# Patient Record
Sex: FEMALE | Race: WHITE | NOT HISPANIC OR LATINO | Employment: STUDENT | URBAN - METROPOLITAN AREA
[De-identification: names, ages, dates, MRNs, and addresses within clinical notes are randomized per-mention and may not be internally consistent; named-entity substitution may affect disease eponyms.]

---
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
## 2019-03-10 ENCOUNTER — HOSPITAL ENCOUNTER (EMERGENCY)
Facility: CLINIC | Age: 19
Discharge: HOME | End: 2019-03-10
Attending: EMERGENCY MEDICINE | Admitting: EMERGENCY MEDICINE

## 2019-03-10 VITALS
SYSTOLIC BLOOD PRESSURE: 119 MMHG | OXYGEN SATURATION: 99 % | DIASTOLIC BLOOD PRESSURE: 89 MMHG | HEART RATE: 83 BPM | TEMPERATURE: 97.5 F | RESPIRATION RATE: 16 BRPM

## 2019-03-10 DIAGNOSIS — R11.2 NON-INTRACTABLE VOMITING WITH NAUSEA, UNSPECIFIED VOMITING TYPE: ICD-10-CM

## 2019-03-10 DIAGNOSIS — F10.921 ALCOHOL INTOXICATION, WITH DELIRIUM (H): ICD-10-CM

## 2019-03-10 DIAGNOSIS — R41.82 ALTERED MENTAL STATUS, UNSPECIFIED ALTERED MENTAL STATUS TYPE: ICD-10-CM

## 2019-03-10 DIAGNOSIS — R45.1 AGITATION REQUIRING SEDATION PROTOCOL: ICD-10-CM

## 2019-03-10 LAB
ALCOHOL BREATH TEST: 0.09 (ref 0–0.01)
ALCOHOL BREATH TEST: 0.1 (ref 0–0.01)
ALCOHOL BREATH TEST: 0.11 (ref 0–0.01)
ALCOHOL BREATH TEST: 0.17 (ref 0–0.01)
ETHANOL SERPL-MCNC: 0.27 G/DL

## 2019-03-10 PROCEDURE — 99284 EMERGENCY DEPT VISIT MOD MDM: CPT | Mod: Z6 | Performed by: EMERGENCY MEDICINE

## 2019-03-10 PROCEDURE — 80320 DRUG SCREEN QUANTALCOHOLS: CPT | Performed by: EMERGENCY MEDICINE

## 2019-03-10 PROCEDURE — 25000128 H RX IP 250 OP 636: Performed by: EMERGENCY MEDICINE

## 2019-03-10 PROCEDURE — 96372 THER/PROPH/DIAG INJ SC/IM: CPT | Performed by: EMERGENCY MEDICINE

## 2019-03-10 PROCEDURE — 99284 EMERGENCY DEPT VISIT MOD MDM: CPT | Performed by: EMERGENCY MEDICINE

## 2019-03-10 PROCEDURE — 82075 ASSAY OF BREATH ETHANOL: CPT | Performed by: EMERGENCY MEDICINE

## 2019-03-10 RX ORDER — ONDANSETRON 4 MG/1
4 TABLET, ORALLY DISINTEGRATING ORAL ONCE
Status: DISCONTINUED | OUTPATIENT
Start: 2019-03-10 | End: 2019-03-10

## 2019-03-10 RX ORDER — OLANZAPINE 10 MG/2ML
10 INJECTION, POWDER, FOR SOLUTION INTRAMUSCULAR ONCE
Status: COMPLETED | OUTPATIENT
Start: 2019-03-10 | End: 2019-03-10

## 2019-03-10 RX ADMIN — OLANZAPINE 10 MG: 10 INJECTION, POWDER, FOR SOLUTION INTRAMUSCULAR at 01:23

## 2019-03-10 NOTE — ED AVS SNAPSHOT
[facility], [place], Emergency Department  [address] [address]  Phone:  [phone]                                    Phoenix Gao   MRN: [id number]    Department:  [facility], Emergency Department   Date of Visit:  3/10/2019           After Visit Summary Signature Page    I have received my discharge instructions, and my questions have been answered. I have discussed any challenges I see with this plan with the nurse or doctor.    ..........................................................................................................................................  Patient/Patient Representative Signature      ..........................................................................................................................................  Patient Representative Print Name and Relationship to Patient    ..................................................               ................................................  Date                                   Time    ..........................................................................................................................................  Reviewed by Signature/Title    ...................................................              ..............................................  Date                                               Time          22EPIC Rev 08/18
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
## 2019-03-10 NOTE — ED NOTES
Emergency Department Patient Sign-out       Brief HPI:  This is a 18 year old female signed out to me by Dr. Shepard.  See initial ED Provider note for details of the presentation.          Patient is medically cleared for discharge.  The patient is not on a hold.      The patient has required medication for agitation.    Patient is seen and examined.  She is no longer clinically intoxicated.  She is alert, awake and quite remorseful.  She will be discharged to follow-up with the [facility] as necessary.      Significant Events prior to my assuming care: Patient required sedation with Zyprexa and slept 7 hours.      Exam:   Patient Vitals for the past 24 hrs:   BP Temp Temp src Pulse Resp SpO2   03/10/19 1300 113/85 -- -- 89 -- 100 %   03/10/19 0930 116/79 -- -- -- -- 100 %   03/10/19 0927 -- -- -- -- -- 100 %   03/10/19 0926 -- -- -- -- -- 96 %   03/10/19 0925 116/79 -- -- 95 -- --   03/10/19 0812 103/63 -- -- 94 -- --   03/10/19 0803 -- -- -- -- -- 98 %   03/10/19 0802 -- -- -- -- -- 97 %   03/10/19 0801 -- -- -- -- -- 97 %   03/10/19 0715 -- -- -- -- -- 96 %   03/10/19 0636 106/88 -- -- 83 -- --   03/10/19 0635 -- -- -- -- -- 100 %   03/10/19 0634 -- -- -- 86 -- --   03/10/19 0410 -- -- -- -- -- 97 %   03/10/19 0350 98/51 -- -- -- -- --   03/10/19 0320 110/66 -- -- 81 -- --   03/10/19 0315 110/66 -- -- -- -- --   03/10/19 0115 -- -- -- -- -- 100 %   03/10/19 0105 114/68 97.5  F (36.4  C) Oral 76 16 100 %           ED RESULTS:   Results for orders placed or performed during the hospital encounter of 03/10/19 (from the past 24 hour(s))   Alcohol     Status: Abnormal    Collection Time: 03/10/19  1:16 AM   Result Value Ref Range    Ethanol g/dL 0.27 (H) <0.01 g/dL   Alcohol breath test POCT     Status: Abnormal    Collection Time: 03/10/19  1:27 AM   Result Value Ref Range    Alcohol Breath Test 0.104 (A) 0.00 - 0.01   Alcohol breath test POCT     Status: Abnormal    Collection Time: 03/10/19  8:08  AM   Result Value Ref Range    Alcohol Breath Test 0.167 (A) 0.00 - 0.01   Alcohol breath test POCT     Status: Abnormal    Collection Time: 03/10/19 10:33 AM   Result Value Ref Range    Alcohol Breath Test 0.114 (A) 0.00 - 0.01   Alcohol breath test POCT     Status: Abnormal    Collection Time: 03/10/19  1:16 PM   Result Value Ref Range    Alcohol Breath Test 0.088 (A) 0.00 - 0.01       ED MEDICATIONS:   Medications   OLANZapine (zyPREXA) injection 10 mg (10 mg Intramuscular Given 3/10/19 0123)         Impression:    ICD-10-CM    1. Alcohol intoxication, with delirium (H) F10.921    2. Agitation requiring sedation protocol R45.1    3. Altered mental status, unspecified altered mental status type R41.82    4. Non-intractable vomiting with nausea, unspecified vomiting type R11.2        Plan:    Patient presented acutely intoxicated and agitated.  She does not currently recall the events which led to emergency room visit.  We discussed alcohol use.  Patient is extremely remorseful.  She was given resources regarding chemical dependency assessment.  She is not holdable.  She will be discharged.      Clemente Seay MD  03/10/19 5668

## 2019-03-10 NOTE — DISCHARGE INSTRUCTIONS
Please use the below resources and your primary care physician to safely cease alcohol use.     Return to the ED if you are having any urgent/life-threatening concerns.     DISCHARGE RESOURCES:  -SMART Recovery - self management for addiction recovery:  www.smartrecClean Filtration Technologyy.org    -[facility] ~ A Health Crisis Resource & Support Center: [phone].  -[facility] [phone]   -[facility] [place] [facility] [phone] or [phone].  -Substance Abuse and Mental Health Services (www.samhsa.gov)  -Harm Reduction Coalition (www. Harmreduction.org)  -Poison control 1-650.942.6992       Sober Support Group Information:  AA/NA & Sponsor/Support  -Alcoholics Anonymous (www.alcoholics-anonymous.org): for local information 24 hours/day  -AA Intergroup service office in [place] (http://www.aasIron Will [employer]ground [employer].org/) [phone]  -AA Intergroup service office in [facility]: [phone]. (http://www.a[place].org/)  -Narcotics Anonymous (www.nam[place].org) (760) 544-4014   -Sober Fun Activities: www.sober-activities.[employer]/[facility]//[facility] Recovery Connection ([facility])  [facility] connects people seeking recovery to resources that help foster and sustain long-term recovery.  Whether you are seeking resources for treatment, transportation, housing, job training, education, health care or other pathways to recovery, [facility] is a great place to start.   Phone: [phone]. www.[place].org.[place] (Great listing of all types of recovery and non-recovery related resources)

## 2019-11-03 ENCOUNTER — HEALTH MAINTENANCE LETTER (OUTPATIENT)
Age: 19
End: 2019-11-03

## 2020-11-16 ENCOUNTER — HEALTH MAINTENANCE LETTER (OUTPATIENT)
Age: 20
End: 2020-11-16

## 2021-01-15 ENCOUNTER — HEALTH MAINTENANCE LETTER (OUTPATIENT)
Age: 21
End: 2021-01-15

## 2021-05-28 ENCOUNTER — RECORDS - HEALTHEAST (OUTPATIENT)
Dept: ADMINISTRATIVE | Facility: CLINIC | Age: 21
End: 2021-05-28

## 2021-09-18 ENCOUNTER — HEALTH MAINTENANCE LETTER (OUTPATIENT)
Age: 21
End: 2021-09-18

## 2021-11-22 ENCOUNTER — HOSPITAL ENCOUNTER (EMERGENCY)
Facility: CLINIC | Age: 21
Discharge: HOME | End: 2021-11-22
Attending: EMERGENCY MEDICINE | Admitting: STUDENT IN AN ORGANIZED HEALTH CARE EDUCATION/TRAINING PROGRAM
Payer: COMMERCIAL

## 2021-11-22 VITALS
OXYGEN SATURATION: 98 % | DIASTOLIC BLOOD PRESSURE: 80 MMHG | SYSTOLIC BLOOD PRESSURE: 123 MMHG | TEMPERATURE: 99.8 F | HEART RATE: 77 BPM | RESPIRATION RATE: 20 BRPM

## 2021-11-22 DIAGNOSIS — T50.901A ACCIDENTAL DRUG OVERDOSE, INITIAL ENCOUNTER: ICD-10-CM

## 2021-11-22 LAB
AMPHETAMINES UR QL SCN: NORMAL
BARBITURATES UR QL: NORMAL
BENZODIAZ UR QL: NORMAL
CANNABINOIDS UR QL SCN: NORMAL
COCAINE UR QL: NORMAL
OPIATES UR QL SCN: NORMAL

## 2021-11-22 PROCEDURE — 99283 EMERGENCY DEPT VISIT LOW MDM: CPT | Performed by: STUDENT IN AN ORGANIZED HEALTH CARE EDUCATION/TRAINING PROGRAM

## 2021-11-22 PROCEDURE — 80307 DRUG TEST PRSMV CHEM ANLYZR: CPT | Performed by: STUDENT IN AN ORGANIZED HEALTH CARE EDUCATION/TRAINING PROGRAM

## 2021-11-22 ASSESSMENT — ENCOUNTER SYMPTOMS
EYE DISCHARGE: 0
FEVER: 0
WEAKNESS: 0
CHILLS: 0
NAUSEA: 0
WOUND: 0
HEADACHES: 0
DIARRHEA: 0
BACK PAIN: 0
HEMATURIA: 0
FLANK PAIN: 0
EYE REDNESS: 0
SHORTNESS OF BREATH: 0
ABDOMINAL PAIN: 0
RHINORRHEA: 0
NECK PAIN: 0
DIZZINESS: 0
SORE THROAT: 0
NUMBNESS: 0
DYSURIA: 0
FACIAL SWELLING: 0
VOMITING: 0
CONSTIPATION: 0
WHEEZING: 0
EYE PAIN: 0
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
## 2021-11-22 NOTE — ED NOTES
Patient Phoenix Gao has been discharged to prior residence/self-care. After visit summary reviewed at bedside, patient voices no concerns at this time. Belongings and prescriptions sent with patient. Patient ambulated independently out of Emergency Department.     Condition during discharge: Stable    /80   Pulse 77   Temp 99.8  F (37.7  C) (Oral)   Resp 20   SpO2 98%     Brenna Riley RN

## 2021-12-17 ENCOUNTER — VIRTUAL VISIT (OUTPATIENT)
Dept: FAMILY MEDICINE | Facility: CLINIC | Age: 21
End: 2021-12-17
Payer: COMMERCIAL

## 2021-12-17 DIAGNOSIS — B00.1 RECURRENT COLD SORES: Primary | ICD-10-CM

## 2021-12-17 PROCEDURE — 99203 OFFICE O/P NEW LOW 30 MIN: CPT | Mod: 95 | Performed by: FAMILY MEDICINE

## 2021-12-17 RX ORDER — VALACYCLOVIR HYDROCHLORIDE 1 G/1
2000 TABLET, FILM COATED ORAL 2 TIMES DAILY
Qty: 4 TABLET | Refills: 3 | Status: SHIPPED | OUTPATIENT
Start: 2021-12-17 | End: 2023-05-02

## 2021-12-17 NOTE — PATIENT INSTRUCTIONS
Did you know?      You can schedule a video visit for follow-up appointments as well as future appointments for certain conditions.  Please see the below link.     [email or web address]    If you have not already done so,  I encourage you to sign up for Number [email or web address] ([email or web address]).  This will allow you to review your results, securely communicate with a provider, and schedule virtual visits as well.

## 2021-12-17 NOTE — PROGRESS NOTES
Kevin is a 21 year old who is being evaluated via a billable video visit.      How would you like to obtain your AVS? MyChart  If the video visit is dropped, the invitation should be resent by: Text to cell phone: [phone]  Will anyone else be joining your video visit? No      Video Start Time: 9:31 AM    Assessment & Plan     Recurrent cold sores  No previous treatment.  We discussed about nature of the disease and triggers.  We discussed about antiviral medication and we agreed to start on valacyclovir.  We discussed how to use it.  If needing to use it on a regular basis she should come in for lab appointment for hepatic and renal follow-up.  Follow-up yearly otherwise.  - valACYclovir (VALTREX) 1000 mg tablet; Take 2 tablets (2,000 mg) by mouth 2 times daily for 1 day             Tobacco Cessation:   reports that she has been smoking. She has never used smokeless tobacco.          No follow-ups on file.    Fabiano Hale MD, MD  [facility]   Kevin is a 21 year old who presents for the following health issues     HPI     Concern - cold sores   Onset: always happen this time of year  Description: 1 cold sore is forming now around mouth   Intensity: mild  Progression of Symptoms:  worsening  Accompanying Signs & Symptoms: tingling   Previous history of similar problem: yes  Precipitating factors:        Worsened by: stress -had finals for school recently  Alleviating factors:        Improved by: none  Therapies tried and outcome: abreeva did not help     Cold sores - for years.   Father and brother had it too.   Usually stress makes it appear.   Last episode a year ago. When happens it lasts for 2.5 weeks or so.   Used abreva - it did not help.   Yesterday noticed bump on lip.       Review of Systems         Objective           Vitals:  No vitals were obtained today due to virtual visit.    Physical Exam   GENERAL: Healthy, alert and no distress  EYES: Eyes  grossly normal to inspection.  No discharge or erythema, or obvious scleral/conjunctival abnormalities.  RESP: No audible wheeze, cough, or visible cyanosis.  No visible retractions or increased work of breathing.    SKIN: per patient right upper lip is developing lesion. Due to camera quality hard to tell.   NEURO: Cranial nerves grossly intact.  Mentation and speech appropriate for age.  PSYCH: Mentation appears normal, affect normal/bright, judgement and insight intact, normal speech and appearance well-groomed.                Video-Visit Details    Type of service:  Video Visit    Video End Time:9:39 AM    Originating Location (pt. Location): Home    Distant Location (provider location):  [facility]     Platform used for Video Visit: [email or web address]

## 2022-01-08 ENCOUNTER — HEALTH MAINTENANCE LETTER (OUTPATIENT)
Age: 22
End: 2022-01-08

## 2022-07-26 ENCOUNTER — TRANSFERRED RECORDS (OUTPATIENT)
Dept: HEALTH INFORMATION MANAGEMENT | Facility: CLINIC | Age: 22
End: 2022-07-26

## 2022-11-19 ENCOUNTER — HEALTH MAINTENANCE LETTER (OUTPATIENT)
Age: 22
End: 2022-11-19

## 2023-04-15 ENCOUNTER — HEALTH MAINTENANCE LETTER (OUTPATIENT)
Age: 23
End: 2023-04-15

## 2023-05-02 ENCOUNTER — OFFICE VISIT (OUTPATIENT)
Dept: FAMILY MEDICINE | Facility: CLINIC | Age: 23
End: 2023-05-02
Payer: COMMERCIAL

## 2023-05-02 ENCOUNTER — PATIENT OUTREACH (OUTPATIENT)
Dept: ONCOLOGY | Facility: CLINIC | Age: 23
End: 2023-05-02

## 2023-05-02 VITALS
OXYGEN SATURATION: 98 % | BODY MASS INDEX: 26.66 KG/M2 | DIASTOLIC BLOOD PRESSURE: 63 MMHG | TEMPERATURE: 98 F | RESPIRATION RATE: 12 BRPM | WEIGHT: 180 LBS | HEIGHT: 69 IN | SYSTOLIC BLOOD PRESSURE: 120 MMHG | HEART RATE: 66 BPM

## 2023-05-02 DIAGNOSIS — Z00.00 VISIT FOR PREVENTIVE HEALTH EXAMINATION: Primary | ICD-10-CM

## 2023-05-02 DIAGNOSIS — Z80.3 FAMILY HISTORY OF BREAST CANCER IN MOTHER: ICD-10-CM

## 2023-05-02 PROBLEM — B00.1 COLD SORE: Status: ACTIVE | Noted: 2021-12-16

## 2023-05-02 PROCEDURE — 99395 PREV VISIT EST AGE 18-39: CPT | Performed by: NURSE PRACTITIONER

## 2023-05-02 ASSESSMENT — ENCOUNTER SYMPTOMS
NERVOUS/ANXIOUS: 1
SORE THROAT: 0
PALPITATIONS: 0
ABDOMINAL PAIN: 0
CHILLS: 0
NAUSEA: 0
HEADACHES: 0
HEMATOCHEZIA: 0
HEMATURIA: 0
HEARTBURN: 0
PARESTHESIAS: 0
WEAKNESS: 0
BREAST MASS: 0
EYE PAIN: 0
DIARRHEA: 0
CONSTIPATION: 0
JOINT SWELLING: 0
DIZZINESS: 0
ARTHRALGIAS: 1
COUGH: 0
SHORTNESS OF BREATH: 0
DYSURIA: 0
FATIGUE: 1
BACK PAIN: 1
FREQUENCY: 0
MYALGIAS: 0
FEVER: 0

## 2023-05-02 NOTE — PROGRESS NOTES
SUBJECTIVE:   CC: Kevin is an 22 year old who presents for preventive health visit.   - graduated, now works part-time. HO low back pain with radicular pain, but better now. Her mother and mother's sister dx with breast cancer around the age of 40. the breast cancer mets with her mother to her bone. She is in a stable relationship and is not concerned about STD's. She had her pap 2 years ago, and it was negative (KENYON completed). Periods are regular, can have cramping, but tolerable.          View : No data to display.              Patient has been advised of split billing requirements and indicates understanding: Yes  Healthy Habits:     Getting at least 3 servings of Calcium per day:  NO    Bi-annual eye exam:  NO    Dental care twice a year:  Yes    Sleep apnea or symptoms of sleep apnea:  None    Diet:  Regular (no restrictions)    Frequency of exercise:  4-5 days/week    Duration of exercise:  30-45 minutes    Taking medications regularly:  Yes    Medication side effects:  Not applicable    PHQ-2 Total Score: 2    Additional concerns today:  Yes  Back Pain   Pertinent negatives include no chest pain, no fever, no headaches, no abdominal pain, no dysuria, no pelvic pain, no paresthesias and no weakness.   Fatigue  Associated symptoms include arthralgias and fatigue. Pertinent negatives include no abdominal pain, chest pain, chills, congestion, coughing, fever, headaches, joint swelling, myalgias, nausea, rash, sore throat or weakness.       Today's PHQ-2 Score:       5/2/2023     2:17 PM   PHQ-2 ( 1999 [employer])   Q1: Little interest or pleasure in doing things 1   Q2: Feeling down, depressed or hopeless 1   PHQ-2 Score 2   Q1: Little interest or pleasure in doing things Several days   Q2: Feeling down, depressed or hopeless Several days   PHQ-2 Score 2       Have you ever done Advance Care Planning? (For example, a Health Directive, POLST, or a discussion with a medical provider or your loved ones about your  wishes): No, advance care planning information given to patient to review.  Patient declined advance care planning discussion at this time.    Social History     Tobacco Use     Smoking status: Former     Types: Cigarettes     Smokeless tobacco: Never   Vaping Use     Vaping status: Not on file   Substance Use Topics     Alcohol use: Yes     Comment: 3 drinks a week              5/2/2023     2:16 PM   Alcohol Use   Prescreen: >3 drinks/day or >7 drinks/week? No     Reviewed orders with patient.  Reviewed health maintenance and updated orders accordingly - Yes  Labs reviewed in EPIC    Breast Cancer Screening:        History of abnormal Pap smear: NO - age 30- 65 PAP every 3 years recommended     Reviewed and updated as needed this visit by clinical staff   Tobacco  Allergies  Meds              Reviewed and updated as needed this visit by Provider                 History reviewed. No pertinent past medical history.   History reviewed. No pertinent surgical history.    Review of Systems   Constitutional: Positive for fatigue. Negative for chills and fever.   HENT: Negative for congestion, ear pain, hearing loss and sore throat.    Eyes: Negative for pain and visual disturbance.   Respiratory: Negative for cough and shortness of breath.    Cardiovascular: Negative for chest pain, palpitations and peripheral edema.   Gastrointestinal: Negative for abdominal pain, constipation, diarrhea, heartburn, hematochezia and nausea.   Breasts:  Negative for tenderness, breast mass and discharge.   Genitourinary: Negative for dysuria, frequency, genital sores, hematuria, pelvic pain, urgency, vaginal bleeding and vaginal discharge.   Musculoskeletal: Positive for arthralgias and back pain. Negative for joint swelling and myalgias.   Skin: Negative for rash.   Neurological: Negative for dizziness, weakness, headaches and paresthesias.   Psychiatric/Behavioral: Negative for mood changes. The patient is nervous/anxious.        "  OBJECTIVE:   /63   Pulse 66   Temp 98  F (36.7  C) (Oral)   Resp 12   Ht 1.74 m (5' 8.5\")   Wt 81.6 kg (180 lb)   LMP 04/17/2023 (Approximate)   SpO2 98%   BMI 26.97 kg/m    Physical Exam  GENERAL: healthy, alert and no distress  EYES: Eyes grossly normal to inspection, PERRL and conjunctivae and sclerae normal  HENT: ear canals and TM's normal, nose and mouth without ulcers or lesions  NECK: no adenopathy, no asymmetry, masses, or scars and thyroid normal to palpation  RESP: lungs clear to auscultation - no rales, rhonchi or wheezes  BREAST: normal without masses, tenderness or nipple discharge and no palpable axillary masses or adenopathy  CV: regular rate and rhythm, normal S1 S2, no S3 or S4, no murmur, click or rub, no peripheral edema and peripheral pulses strong  ABDOMEN: soft, nontender, no hepatosplenomegaly, no masses and bowel sounds normal  MS: no gross musculoskeletal defects noted, no edema  SKIN: no suspicious lesions or rashes  NEURO: Normal strength and tone, mentation intact and speech normal  PSYCH: mentation appears normal, affect normal/bright    Diagnostic Test Results:  Labs reviewed in Epic    ASSESSMENT/PLAN:       ICD-10-CM    1. Visit for preventive health examination  Z00.00       2. Family history of breast cancer in mother  Z80.3 Adult Oncology/Hematology  Referral        - I discussed getting information about any genetic counseling that was completed on her mother/aunt.   - she declined HM items today.     Patient has been advised of split billing requirements and indicates understanding: Yes      COUNSELING:  Reviewed preventive health counseling, as reflected in patient instructions        She reports that she has quit smoking. Her smoking use included cigarettes. She has never used smokeless tobacco.          [facility]"

## 2023-05-02 NOTE — PROGRESS NOTES
Writer received Genetics - Cancer Risk referral, referred for:     mother and mother sister age 40ish, dx with breast cancer     Reviewed for appropriate plan, and sent to New Patient Scheduling for completion.

## 2024-06-16 ENCOUNTER — HEALTH MAINTENANCE LETTER (OUTPATIENT)
Age: 24
End: 2024-06-16

## 2025-06-21 ENCOUNTER — HEALTH MAINTENANCE LETTER (OUTPATIENT)
Age: 25
End: 2025-06-21